# Patient Record
(demographics unavailable — no encounter records)

---

## 2024-11-22 NOTE — DISCUSSION/SUMMARY
[FreeTextEntry1] : This is an 84 year old man with a history of obesity and hyperlipidemia who presents to the office for a cardiac evaluation.  I saw him in early June of 2022 and he was noted to be in new onset AF. He had a Holter that showed persistent rate controlled AF.  He had a nuclear stress test that was negative for ischemia.  He had an echocardiogram that showed normal LV function with mild to moderate aortic root dilation.  He had a repeat echo today, which will be followed.  His BP is controlled today.    His HR is controlled.   He is taking Eliquis, and will continue it at 5 bid.    His CHADSVASC is 2.  He is going to continue his statin drug.  We discussed the benefits of a healthy diet, regular exercise and physical activity, and weight loss in detail. HE does not want to get checked for sleep apnea citing cost concerns. I will see him back in 3 months.  He knows to call the office with any issues.    [EKG obtained to assist in diagnosis and management of assessed problem(s)] : EKG obtained to assist in diagnosis and management of assessed problem(s)

## 2024-11-22 NOTE — HISTORY OF PRESENT ILLNESS
[FreeTextEntry1] : This is an 84 year old man with a history of obesity and hyperlipidemia who presents to the office for a cardiac evaluation.  I saw him in early June of 2022 and he was noted to be in new onset AF. He had a Holter that showed persistent rate controlled AF.  He had a nuclear stress test that was negative for ischemia.  He had an echocardiogram that showed normal LV function with mild to moderate aortic root dilation.  There was no significant valvular heart disease.  He is not exercising and has not lost any weight.  He denies exertional dyspnea.  He has been taking his Eliquis as prescribed.  He does not have chest pains, PND, orthopnea, LE swelling, dizziness, or syncope.   He does not feel palpitations or a racing heart.  He denies CHF, DM, CVA, TIA, or any vascular disease.   His BP is well controlled.   HR is controlled.  NO symptoms with his usual degree of walking.

## 2024-11-22 NOTE — PHYSICAL EXAM

## 2025-02-05 NOTE — HISTORY OF PRESENT ILLNESS
[Post-hospitalization from ___ Hospital] : Post-hospitalization from [unfilled] Hospital [Admitted on: ___] : The patient was admitted on [unfilled] [Discharged on ___] : discharged on [unfilled] [Discharge Summary] : discharge summary [Pertinent Labs] : pertinent labs [Radiology Findings] : radiology findings [Discharge Med List] : discharge medication list [Med Reconciliation] : medication reconciliation has been completed [Patient Contacted By: ____] : and contacted by [unfilled] [FreeTextEntry2] : ZAK QUISPE is an 84-year-old M who presents today for hospital follow up from [facility]. The patient was admitted to the hospital from 01/26/25 to 01/31/2025 for Pneumonia. The patient was initially having SOB on exertion, could barely walk for two weeks. He presented to the Er with cough and GONSALES. The hospitalization was complicated by an episode of rapid atrial fibrillation. Dr. Tapia, his cardiologist saw him at the hospital. He was also seen by pulmonary medicine.The pneumonia was not seen on CXR but became apparent on CT Chest. He received Rocephin and Azithromycin while hospitalized. He was discharged on Advair 100/50 diskus 2 puffs BID and Spiriva 2 puffs qd. Patient was treated with IV cephtrixone and zithromax.  Patient was dicharged with advair 1 puff daily and sprivia 2 puffs a day. Patient is now feeling normal except for residual mucus filled cough.

## 2025-02-05 NOTE — HISTORY OF PRESENT ILLNESS
[Post-hospitalization from ___ Hospital] : Post-hospitalization from [unfilled] Hospital [Admitted on: ___] : The patient was admitted on [unfilled] [Discharged on ___] : discharged on [unfilled] [Discharge Summary] : discharge summary [Pertinent Labs] : pertinent labs [Radiology Findings] : radiology findings [Discharge Med List] : discharge medication list [Med Reconciliation] : medication reconciliation has been completed [Patient Contacted By: ____] : and contacted by [unfilled] [FreeTextEntry2] : ZAK QIUSPE is an 84-year-old M who presents today for hospital follow up from [facility]. The patient was admitted to the hospital from 01/26/25 to 01/31/2025 for Pneumonia. The patient was initially having SOB on exertion, could barely walk for two weeks. He presented to the Er with cough and GONSALES. The hospitalization was complicated by an episode of rapid atrial fibrillation. Dr. Tapia, his cardiologist saw him at the hospital. He was also seen by pulmonary medicine.The pneumonia was not seen on CXR but became apparent on CT Chest. He received Rocephin and Azithromycin while hospitalized. He was discharged on Advair 100/50 diskus 2 puffs BID and Spiriva 2 puffs qd. Patient was treated with IV cephtrixone and zithromax.  Patient was dicharged with advair 1 puff daily and sprivia 2 puffs a day. Patient is now feeling normal except for residual mucus filled cough.

## 2025-02-05 NOTE — PHYSICAL EXAM
[No Acute Distress] : no acute distress [Well Nourished] : well nourished [Well Developed] : well developed [Well-Appearing] : well-appearing [Normal Sclera/Conjunctiva] : normal sclera/conjunctiva [PERRL] : pupils equal round and reactive to light [EOMI] : extraocular movements intact [Normal Outer Ear/Nose] : the outer ears and nose were normal in appearance [Normal Oropharynx] : the oropharynx was normal [No JVD] : no jugular venous distention [No Lymphadenopathy] : no lymphadenopathy [Supple] : supple [Thyroid Normal, No Nodules] : the thyroid was normal and there were no nodules present [No Respiratory Distress] : no respiratory distress  [No Accessory Muscle Use] : no accessory muscle use [Clear to Auscultation] : lungs were clear to auscultation bilaterally [Normal Rate] : normal rate  [Regular Rhythm] : with a regular rhythm [Normal S1, S2] : normal S1 and S2 [No Murmur] : no murmur heard [No Carotid Bruits] : no carotid bruits [No Abdominal Bruit] : a ~M bruit was not heard ~T in the abdomen [No Varicosities] : no varicosities [Pedal Pulses Present] : the pedal pulses are present [No Edema] : there was no peripheral edema [No Palpable Aorta] : no palpable aorta [No Extremity Clubbing/Cyanosis] : no extremity clubbing/cyanosis [Soft] : abdomen soft [Non Tender] : non-tender [Non-distended] : non-distended [No Masses] : no abdominal mass palpated [No HSM] : no HSM [Normal Bowel Sounds] : normal bowel sounds [Normal Posterior Cervical Nodes] : no posterior cervical lymphadenopathy [Normal Anterior Cervical Nodes] : no anterior cervical lymphadenopathy [No CVA Tenderness] : no CVA  tenderness [No Spinal Tenderness] : no spinal tenderness [No Joint Swelling] : no joint swelling [Grossly Normal Strength/Tone] : grossly normal strength/tone [No Rash] : no rash [Coordination Grossly Intact] : coordination grossly intact [No Focal Deficits] : no focal deficits [Normal Gait] : normal gait [Deep Tendon Reflexes (DTR)] : deep tendon reflexes were 2+ and symmetric [Normal Affect] : the affect was normal [Normal Insight/Judgement] : insight and judgment were intact [Normal Voice/Communication] : normal voice/communication [Normal TMs] : both tympanic membranes were normal [Normal Nasal Mucosa] : the nasal mucosa was normal [Normal Supraclavicular Nodes] : no supraclavicular lymphadenopathy [Kyphosis] : no kyphosis [Scoliosis] : no scoliosis [Acne] : no acne [Speech Grossly Normal] : speech grossly normal [Memory Grossly Normal] : memory grossly normal [Alert and Oriented x3] : oriented to person, place, and time [Normal Mood] : the mood was normal [de-identified] : upper airway rhonchi, mucus on cough [de-identified] : irregularly, irregular rhythm [de-identified] : obese

## 2025-02-05 NOTE — PHYSICAL EXAM
[No Acute Distress] : no acute distress [Well Nourished] : well nourished [Well Developed] : well developed [Well-Appearing] : well-appearing [Normal Sclera/Conjunctiva] : normal sclera/conjunctiva [PERRL] : pupils equal round and reactive to light [EOMI] : extraocular movements intact [Normal Outer Ear/Nose] : the outer ears and nose were normal in appearance [Normal Oropharynx] : the oropharynx was normal [No JVD] : no jugular venous distention [No Lymphadenopathy] : no lymphadenopathy [Supple] : supple [Thyroid Normal, No Nodules] : the thyroid was normal and there were no nodules present [No Respiratory Distress] : no respiratory distress  [No Accessory Muscle Use] : no accessory muscle use [Clear to Auscultation] : lungs were clear to auscultation bilaterally [Normal Rate] : normal rate  [Regular Rhythm] : with a regular rhythm [Normal S1, S2] : normal S1 and S2 [No Murmur] : no murmur heard [No Carotid Bruits] : no carotid bruits [No Abdominal Bruit] : a ~M bruit was not heard ~T in the abdomen [No Varicosities] : no varicosities [Pedal Pulses Present] : the pedal pulses are present [No Edema] : there was no peripheral edema [No Palpable Aorta] : no palpable aorta [No Extremity Clubbing/Cyanosis] : no extremity clubbing/cyanosis [Soft] : abdomen soft [Non Tender] : non-tender [Non-distended] : non-distended [No Masses] : no abdominal mass palpated [No HSM] : no HSM [Normal Bowel Sounds] : normal bowel sounds [Normal Posterior Cervical Nodes] : no posterior cervical lymphadenopathy [Normal Anterior Cervical Nodes] : no anterior cervical lymphadenopathy [No CVA Tenderness] : no CVA  tenderness [No Spinal Tenderness] : no spinal tenderness [No Joint Swelling] : no joint swelling [Grossly Normal Strength/Tone] : grossly normal strength/tone [No Rash] : no rash [Coordination Grossly Intact] : coordination grossly intact [No Focal Deficits] : no focal deficits [Normal Gait] : normal gait [Deep Tendon Reflexes (DTR)] : deep tendon reflexes were 2+ and symmetric [Normal Affect] : the affect was normal [Normal Insight/Judgement] : insight and judgment were intact [Normal Voice/Communication] : normal voice/communication [Normal TMs] : both tympanic membranes were normal [Normal Nasal Mucosa] : the nasal mucosa was normal [Normal Supraclavicular Nodes] : no supraclavicular lymphadenopathy [Kyphosis] : no kyphosis [Scoliosis] : no scoliosis [Acne] : no acne [Speech Grossly Normal] : speech grossly normal [Memory Grossly Normal] : memory grossly normal [Alert and Oriented x3] : oriented to person, place, and time [Normal Mood] : the mood was normal [de-identified] : upper airway rhonchi, mucus on cough [de-identified] : irregularly, irregular rhythm [de-identified] : obese

## 2025-02-05 NOTE — END OF VISIT
[FreeTextEntry3] : I, Mila Chaves, acted solely as scribe for Dr. Arian Mcallister DO on this date 02/05/2025.  All medical record entries made by the Scribe were at my, Dr. Arian Mcallister DO direction and personally dictated by me on 02/05/2025, I have reviewed the chart and agree that the record accurately reflects my personal performance of the history, physical exam, assessment and plan. I have also personally directed, reviewed and agreed with the chart.   [Time Spent: ___ minutes] : I have spent [unfilled] minutes of time on the encounter which excludes teaching and separately reported services.

## 2025-02-05 NOTE — PLAN
[FreeTextEntry1] : Pulmonary resolving right sided PNA -Rx given for CT scan of the chest- recommended to go at the beginning of March 2025-assess for resolution of infiltrates, not seen on CXR -start Mucinex p.o for expectorating upper airway mucous -continue with advair and sprivia  - follow up with Dr. Tapia, cardiologist -follow up with  pulmonologist-to get PFTs at pulmonary appointment -follow up in 6 weeks. FBW

## 2025-02-05 NOTE — ASSESSMENT
[FreeTextEntry1] : ZAK QUISPE is an 84-year-old M with a past medical history as above who presents for post hospital discharge for Pneumonia complicated by rapid atrial fibrillation.

## 2025-02-28 NOTE — HISTORY OF PRESENT ILLNESS
[FreeTextEntry1] : This is an 84 year old man with a history of obesity and hyperlipidemia who presents to the office for a cardiac evaluation.  I saw him in early June of 2022 and he was noted to be in new onset AF. He had a Holter that showed persistent rate controlled AF.  He had a nuclear stress test that was negative for ischemia.  He had an echocardiogram that showed normal LV function with mild to moderate aortic root dilation.  There was no significant valvular heart disease.  He was admitted to  with PNA in January of 2025.  He had rapid AF that was ultimately controlled as his PNA was treated. He was given IV abx.  He has now completed his abx therapy.  He is not exercising and has not lost any weight.  He denies exertional dyspnea.  He has been taking his Eliquis as prescribed.  He does not have chest pains, PND, orthopnea, LE swelling, dizziness, or syncope.   He does not feel palpitations or a racing heart.  He denies CHF, DM, CVA, TIA, or any vascular disease.   His BP is well controlled.   HR is controlled.  NO symptoms with his usual degree of walking.

## 2025-02-28 NOTE — DISCUSSION/SUMMARY
[FreeTextEntry1] : This is an 84 year old man with a history of obesity and hyperlipidemia who presents to the office for a cardiac evaluation.  I saw him in early June of 2022 and he was noted to be in new onset AF. He had a Holter that showed persistent rate controlled AF.  He had a nuclear stress test that was negative for ischemia.  He had an echocardiogram that showed normal LV function with mild to moderate aortic root dilation.  There was no significant valvular heart disease.  His BP is controlled today.    His HR is controlled.   He is taking Eliquis, and will continue it at 5 bid.    His CHADSVASC is 2.  He is going to continue his statin drug.  He is recovered from his admission for PNA in January without issues.  We discussed the benefits of a healthy diet, regular exercise and physical activity, and weight loss in detail. HE does not want to get checked for sleep apnea citing cost concerns. I will see him back in 3 months.  He knows to call the office with any issues. [EKG obtained to assist in diagnosis and management of assessed problem(s)] : EKG obtained to assist in diagnosis and management of assessed problem(s)

## 2025-02-28 NOTE — PHYSICAL EXAM

## 2025-03-12 NOTE — PHYSICAL EXAM
[No Acute Distress] : no acute distress [Well Nourished] : well nourished [Well Developed] : well developed [Well-Appearing] : well-appearing [Normal Voice/Communication] : normal voice/communication [Normal Sclera/Conjunctiva] : normal sclera/conjunctiva [PERRL] : pupils equal round and reactive to light [EOMI] : extraocular movements intact [Normal Outer Ear/Nose] : the outer ears and nose were normal in appearance [Normal Oropharynx] : the oropharynx was normal [Normal TMs] : both tympanic membranes were normal [Normal Nasal Mucosa] : the nasal mucosa was normal [No JVD] : no jugular venous distention [No Lymphadenopathy] : no lymphadenopathy [Supple] : supple [Thyroid Normal, No Nodules] : the thyroid was normal and there were no nodules present [No Respiratory Distress] : no respiratory distress  [No Accessory Muscle Use] : no accessory muscle use [Clear to Auscultation] : lungs were clear to auscultation bilaterally [Normal Rate] : normal rate  [Normal S1, S2] : normal S1 and S2 [No Carotid Bruits] : no carotid bruits [No Abdominal Bruit] : a ~M bruit was not heard ~T in the abdomen [Pedal Pulses Present] : the pedal pulses are present [No Edema] : there was no peripheral edema [No Palpable Aorta] : no palpable aorta [Soft] : abdomen soft [Non Tender] : non-tender [Non-distended] : non-distended [No Masses] : no abdominal mass palpated [No HSM] : no HSM [Normal Bowel Sounds] : normal bowel sounds [Normal Supraclavicular Nodes] : no supraclavicular lymphadenopathy [Normal Posterior Cervical Nodes] : no posterior cervical lymphadenopathy [Normal Anterior Cervical Nodes] : no anterior cervical lymphadenopathy [No CVA Tenderness] : no CVA  tenderness [No Spinal Tenderness] : no spinal tenderness [No Joint Swelling] : no joint swelling [Grossly Normal Strength/Tone] : grossly normal strength/tone [No Rash] : no rash [Coordination Grossly Intact] : coordination grossly intact [No Focal Deficits] : no focal deficits [Normal Gait] : normal gait [Deep Tendon Reflexes (DTR)] : deep tendon reflexes were 2+ and symmetric [Speech Grossly Normal] : speech grossly normal [Memory Grossly Normal] : memory grossly normal [Normal Affect] : the affect was normal [Alert and Oriented x3] : oriented to person, place, and time [Normal Mood] : the mood was normal [Normal Insight/Judgement] : insight and judgment were intact [Normal] : affect was normal and insight and judgment were intact [Kyphosis] : no kyphosis [Scoliosis] : no scoliosis [Acne] : no acne [de-identified] : w/ murmur and irregular rate  [de-identified] : obese

## 2025-03-12 NOTE — COUNSELING
[Yes] : Risk of tobacco use and health benefits of smoking cessation discussed: Yes [Cessation strategies including cessation program discussed] : Cessation strategies including cessation program discussed [Encouraged to pick a quit date and identify support needed to quit] : Encouraged to pick a quit date and identify support needed to quit [No] : Not willing to quit smoking [Benefits of weight loss discussed] : Benefits of weight loss discussed [Encouraged to increase physical activity] : Encouraged to increase physical activity [Good understanding] : Patient has a good understanding of disease, goals and obesity follow-up plan [FreeTextEntry1] : 3 [FreeTextEntry2] : continue with walking  [FreeTextEntry4] : 15

## 2025-03-12 NOTE — HEALTH RISK ASSESSMENT
[Never (0 pts)] : Never (0 points) [No] : In the past 12 months have you used drugs other than those required for medical reasons? No [No falls in past year] : Patient reported no falls in the past year [Little interest or pleasure doing things] : 1) Little interest or pleasure doing things [0] : 1) Little interest or pleasure doing things: Not at all (0) [Feeling down, depressed, or hopeless] : 2) Feeling down, depressed, or hopeless [1] : 2) Feeling down, depressed, or hopeless for several days (1) [PHQ-2 Negative - No further assessment needed] : PHQ-2 Negative - No further assessment needed [I have developed a follow-up plan documented below in the note.] : I have developed a follow-up plan documented below in the note. [Time Spent: ___ Minutes] : I spent [unfilled] minutes performing a depression screening for this patient. [Current] : Current [de-identified] : Cardio [Audit-CScore] : 0 [[id number]] : 1 [de-identified] : kyra

## 2025-03-12 NOTE — ASSESSMENT
[FreeTextEntry1] : Mr. QUISPE is a 84 year-old male, with a past medical history as noted above, who present to the office today for medication renewal and fasting abs

## 2025-03-12 NOTE — HISTORY OF PRESENT ILLNESS
[FreeTextEntry1] : fasting blood work and general follow-up.  [de-identified] : Mr. QUISPE is a 84-year-old-male, with a past medical history as noted below, who present to the office today for  fasting labs and follow up.  Brought list from [facility] about his recent vax hx.   states he feels much better - denies SOB, Fever or cough States he ran out of Metoprolol and diltiazem - been off the medication for a week or so  Requesting a renewal of trazadone

## 2025-03-12 NOTE — PHYSICAL EXAM
[No Acute Distress] : no acute distress [Well Nourished] : well nourished [Well Developed] : well developed [Well-Appearing] : well-appearing [Normal Voice/Communication] : normal voice/communication [Normal Sclera/Conjunctiva] : normal sclera/conjunctiva [PERRL] : pupils equal round and reactive to light [EOMI] : extraocular movements intact [Normal Outer Ear/Nose] : the outer ears and nose were normal in appearance [Normal Oropharynx] : the oropharynx was normal [Normal TMs] : both tympanic membranes were normal [Normal Nasal Mucosa] : the nasal mucosa was normal [No JVD] : no jugular venous distention [No Lymphadenopathy] : no lymphadenopathy [Supple] : supple [Thyroid Normal, No Nodules] : the thyroid was normal and there were no nodules present [No Respiratory Distress] : no respiratory distress  [No Accessory Muscle Use] : no accessory muscle use [Clear to Auscultation] : lungs were clear to auscultation bilaterally [Normal Rate] : normal rate  [Normal S1, S2] : normal S1 and S2 [No Carotid Bruits] : no carotid bruits [No Abdominal Bruit] : a ~M bruit was not heard ~T in the abdomen [Pedal Pulses Present] : the pedal pulses are present [No Edema] : there was no peripheral edema [No Palpable Aorta] : no palpable aorta [Soft] : abdomen soft [Non Tender] : non-tender [Non-distended] : non-distended [No Masses] : no abdominal mass palpated [No HSM] : no HSM [Normal Bowel Sounds] : normal bowel sounds [Normal Supraclavicular Nodes] : no supraclavicular lymphadenopathy [Normal Posterior Cervical Nodes] : no posterior cervical lymphadenopathy [Normal Anterior Cervical Nodes] : no anterior cervical lymphadenopathy [No CVA Tenderness] : no CVA  tenderness [No Spinal Tenderness] : no spinal tenderness [No Joint Swelling] : no joint swelling [Grossly Normal Strength/Tone] : grossly normal strength/tone [No Rash] : no rash [Coordination Grossly Intact] : coordination grossly intact [No Focal Deficits] : no focal deficits [Normal Gait] : normal gait [Deep Tendon Reflexes (DTR)] : deep tendon reflexes were 2+ and symmetric [Speech Grossly Normal] : speech grossly normal [Memory Grossly Normal] : memory grossly normal [Normal Affect] : the affect was normal [Alert and Oriented x3] : oriented to person, place, and time [Normal Mood] : the mood was normal [Normal Insight/Judgement] : insight and judgment were intact [Normal] : affect was normal and insight and judgment were intact [Kyphosis] : no kyphosis [Scoliosis] : no scoliosis [Acne] : no acne [de-identified] : w/ murmur and irregular rate  [de-identified] : obese

## 2025-03-12 NOTE — HEALTH RISK ASSESSMENT
[Never (0 pts)] : Never (0 points) [No] : In the past 12 months have you used drugs other than those required for medical reasons? No [No falls in past year] : Patient reported no falls in the past year [Little interest or pleasure doing things] : 1) Little interest or pleasure doing things [0] : 1) Little interest or pleasure doing things: Not at all (0) [Feeling down, depressed, or hopeless] : 2) Feeling down, depressed, or hopeless [1] : 2) Feeling down, depressed, or hopeless for several days (1) [PHQ-2 Negative - No further assessment needed] : PHQ-2 Negative - No further assessment needed [I have developed a follow-up plan documented below in the note.] : I have developed a follow-up plan documented below in the note. [Time Spent: ___ Minutes] : I spent [unfilled] minutes performing a depression screening for this patient. [Current] : Current [de-identified] : Cardio [Audit-CScore] : 0 [[id number]] : 1 [de-identified] : kyra

## 2025-03-12 NOTE — HISTORY OF PRESENT ILLNESS
[FreeTextEntry1] : fasting blood work and general follow-up.  [de-identified] : Mr. QUISPE is a 84-year-old-male, with a past medical history as noted below, who present to the office today for  fasting labs and follow up.  Brought list from [facility] about his recent vax hx.   states he feels much better - denies SOB, Fever or cough States he ran out of Metoprolol and diltiazem - been off the medication for a week or so  Requesting a renewal of trazadone

## 2025-03-12 NOTE — PLAN
[FreeTextEntry1] : Pulmonary s/p pneumonia- Feeling better -  Continue with inhalers - Advised to d/c tobacco  Pneumovax completed   Cardiovascular hyperlipidemia - continue Simvastatin 20mg p.o.q.d. as directed - advised low cholesterol/low fat diet - check FLP and LFTs. Advised diet  if LDL not less than 70 consider changing to atorvastatin  Follow up w/ cardiology  Atrial fibrillation - continue Eliquis 5mg BID p.o.q.d. as directed Continue to follow up with cardiologist, Dr. Ocampo. Advised and educated pt for 15 min about compliance w/ meds Advised to restart Diltiazem and metoprolol as prescribe at the hospital. Monitor BP  Cardiac counseling was provided to ZAK QUISPE. We discussed the importance of diet and exercise.  We discussed low na, low carbs, and  low fat diets.   ZAK was counseled about the importance of medication compliance. Risk and benefits was discussed to the patient about their diagnosis and treatment   Endocrinology Adult BMI screening and followup:  face to face education and discussion lasting 15 minutes. The patient's BMI is about 34. Counseled patient regarding BMI, healthy eating, portion control and exercise importance of diet to maintain a healthy weight. Counseled patient on importance of exercise to maintain a healthy weight  history of Vitamin D deficiency - continue Vitamin D-3 1000 IU p.o.q.d. with a meal as directed  Psychiatry insomnia - continue Trazodone 50mg p.o.q.h.s. as directed. Refilled today   Urology prostate cancer - s/p radiation therapy - continue to follow up with urologist, Dr. Iraheta and radiation oncologist, Dr. Rodríguez. Check PSA levels - will foward results to Dr. Iraheta office.   Reviewed immunization given by [facility]  - see scan   I spent 40 Minutes with the patient, half of which we discussed finding on physical exam and coordinated care.  As well as reviewed my plans and follow ups. Dragon speech recognition software was used to create portions of this document.  An attempt at proofreading has been made to minimize errors please call if any questions arise.

## 2025-03-12 NOTE — REVIEW OF SYSTEMS
[Negative] : Heme/Lymph [Chills] : no chills [Fatigue] : no fatigue [Discharge] : no discharge [Chest Pain] : no chest pain [Palpitations] : no palpitations [Claudication] : no  leg claudication [Lower Ext Edema] : no lower extremity edema [Wheezing] : no wheezing [Cough] : no cough [Abdominal Pain] : no abdominal pain [Nausea] : no nausea [Heartburn] : no heartburn [Dysuria] : no dysuria [Frequency] : no frequency [Joint Pain] : no joint pain [Skin Rash] : no skin rash [Dizziness] : no dizziness [Memory Loss] : no memory loss [Easy Bruising] : no easy bruising [Swollen Glands] : no swollen glands

## 2025-04-29 NOTE — ASSESSMENT
[FreeTextEntry1] : Mr. QUISPE is a 84 year-old male, with a past medical history as noted above, who present to the office today for medication renewal and b/l hand numbness

## 2025-04-29 NOTE — HEALTH RISK ASSESSMENT
[Never (0 pts)] : Never (0 points) [No] : In the past 12 months have you used drugs other than those required for medical reasons? No [No falls in past year] : Patient reported no falls in the past year [Little interest or pleasure doing things] : 1) Little interest or pleasure doing things [0] : 1) Little interest or pleasure doing things: Not at all (0) [Feeling down, depressed, or hopeless] : 2) Feeling down, depressed, or hopeless [1] : 2) Feeling down, depressed, or hopeless for several days (1) [PHQ-2 Negative - No further assessment needed] : PHQ-2 Negative - No further assessment needed [I have developed a follow-up plan documented below in the note.] : I have developed a follow-up plan documented below in the note. [Time Spent: ___ Minutes] : I spent [unfilled] minutes performing a depression screening for this patient. [Current] : Current [de-identified] : Cardio [Audit-CScore] : 0 [[id number]] : 1 [de-identified] : kyra

## 2025-04-29 NOTE — PHYSICAL EXAM
[No Acute Distress] : no acute distress [Well Nourished] : well nourished [Well Developed] : well developed [Well-Appearing] : well-appearing [Normal Voice/Communication] : normal voice/communication [Normal Sclera/Conjunctiva] : normal sclera/conjunctiva [PERRL] : pupils equal round and reactive to light [EOMI] : extraocular movements intact [Normal Outer Ear/Nose] : the outer ears and nose were normal in appearance [Normal Oropharynx] : the oropharynx was normal [Normal TMs] : both tympanic membranes were normal [Normal Nasal Mucosa] : the nasal mucosa was normal [No JVD] : no jugular venous distention [No Lymphadenopathy] : no lymphadenopathy [Supple] : supple [Thyroid Normal, No Nodules] : the thyroid was normal and there were no nodules present [No Respiratory Distress] : no respiratory distress  [No Accessory Muscle Use] : no accessory muscle use [Clear to Auscultation] : lungs were clear to auscultation bilaterally [Normal Rate] : normal rate  [Normal S1, S2] : normal S1 and S2 [No Carotid Bruits] : no carotid bruits [No Abdominal Bruit] : a ~M bruit was not heard ~T in the abdomen [Pedal Pulses Present] : the pedal pulses are present [No Edema] : there was no peripheral edema [No Palpable Aorta] : no palpable aorta [Soft] : abdomen soft [Non Tender] : non-tender [Non-distended] : non-distended [No Masses] : no abdominal mass palpated [No HSM] : no HSM [Normal Bowel Sounds] : normal bowel sounds [Normal Supraclavicular Nodes] : no supraclavicular lymphadenopathy [Normal Posterior Cervical Nodes] : no posterior cervical lymphadenopathy [Normal Anterior Cervical Nodes] : no anterior cervical lymphadenopathy [No CVA Tenderness] : no CVA  tenderness [No Spinal Tenderness] : no spinal tenderness [No Joint Swelling] : no joint swelling [Grossly Normal Strength/Tone] : grossly normal strength/tone [No Rash] : no rash [Coordination Grossly Intact] : coordination grossly intact [No Focal Deficits] : no focal deficits [Normal Gait] : normal gait [Deep Tendon Reflexes (DTR)] : deep tendon reflexes were 2+ and symmetric [Speech Grossly Normal] : speech grossly normal [Memory Grossly Normal] : memory grossly normal [Normal Affect] : the affect was normal [Alert and Oriented x3] : oriented to person, place, and time [Normal Mood] : the mood was normal [Normal Insight/Judgement] : insight and judgment were intact [Normal] : affect was normal and insight and judgment were intact [Kyphosis] : no kyphosis [Scoliosis] : no scoliosis [Acne] : no acne [de-identified] : w/ murmur and irregular rate  [de-identified] : obese

## 2025-04-29 NOTE — PLAN
[FreeTextEntry1] : Pulmonary s/p pneumonia- Feeling better -  Continue with inhalers - Advised to d/c tobacco   Cardiovascular hyperlipidemia - Advised patient to continue with atorvastatin 20mg p.o.q.d. as directed - advised low cholesterol/low fat diet - check FLP and LFTs. 06/25 Follow up w/ cardiology 05/25   Atrial fibrillation - continue Eliquis 5mg BID p.o.q.d. as directed Continue to follow up with cardiologist, Dr. Ocampo. Continue with Diltiazem and metoprolol as prescribe at the hospital. Monitor BP  Cardiac counseling was provided to ZAK QUISPE. We discussed the importance of diet and exercise.  We discussed low na, low carbs, and  low fat diets.   ZAK was counseled about the importance of medication compliance. Risk and benefits was discussed to the patient about their diagnosis and treatment    Orthopedic-bilateral carpal tunnel syndrome-follow-up with orthopedic to consider treatment options including surgical  Endocrinology Adult BMI screening and followup:  face to face education and discussion lasting 5 minutes. The patient's BMI is about 34. Counseled patient regarding BMI, healthy eating, portion control and exercise importance of diet to maintain a healthy weight. Counseled patient on importance of exercise to maintain a healthy weight  history of Vitamin D deficiency - continue Vitamin D-3 1000 IU p.o.q.d. with a meal as directed  Psychiatry insomnia - continue Trazodone 50mg p.o.q.h.s. as directed. Refilled today  Sleep hygiene advised.  Urology prostate cancer - s/p radiation therapy - continue to follow up with urologist, Dr. Iraheta and radiation oncologist, Dr. Rodríguez. Check PSA levels - will forward results to Dr. Iraheta office.   I spent 30 Minutes with the patient, half of which we discussed finding on physical exam and coordinated care.  As well as reviewed my plans and follow ups. Dragon speech recognition software was used to create portions of this document.  An attempt at proofreading has been made to minimize errors please call if any questions arise.

## 2025-04-29 NOTE — REVIEW OF SYSTEMS
[Negative] : Heme/Lymph [Fever] : no fever [Chills] : no chills [Fatigue] : no fatigue [Discharge] : no discharge [Pain] : no pain [Earache] : no earache [Sore Throat] : no sore throat [Chest Pain] : no chest pain [Palpitations] : no palpitations [Claudication] : no  leg claudication [Lower Ext Edema] : no lower extremity edema [Wheezing] : no wheezing [Cough] : no cough [Abdominal Pain] : no abdominal pain [Nausea] : no nausea [Heartburn] : no heartburn [Dysuria] : no dysuria [Frequency] : no frequency [Joint Pain] : no joint pain [Skin Rash] : no skin rash [Dizziness] : no dizziness [Memory Loss] : no memory loss [Anxiety] : no anxiety [Easy Bruising] : no easy bruising [Swollen Glands] : no swollen glands

## 2025-04-29 NOTE — HISTORY OF PRESENT ILLNESS
[FreeTextEntry1] : Follow up from pneumonia and medication renewal   [de-identified] : Mr. QUISPE is a 84-year-old-male, with a past medical history as noted below, who present to the office today for  follow up from pneumonia -patient states currently he is taking all his medication as prescribed.  Restarted diltiazem and metoprolol which she had previously stopped.  Denies having any adverse side effects to current medication regimen.  Also requesting renewal on trazodone which she takes for sleep/insomnia with moderate states it only gives him about 4 hours of sleep.  Has a follow-up appointment cardiology and may end a physical in early June patient denies having any fever, chills, night sweats, chest pain or shortness of breath. Patient states since the weather has been nice and he started walking denies feeling shortness of breath or dyspnea on exertion with his walks.  He is compliant with using his inhalers.  Patient states he has been having bilateral hand numbness in the first 3 digits.  In the past did see orthopedic was diagnosed with carpal tunnel syndrome..  States he had an injection which seemed to help.  Would like to see another orthopedic to get more injections

## 2025-04-29 NOTE — COUNSELING
[Yes] : Risk of tobacco use and health benefits of smoking cessation discussed: Yes [Cessation strategies including cessation program discussed] : Cessation strategies including cessation program discussed [Encouraged to pick a quit date and identify support needed to quit] : Encouraged to pick a quit date and identify support needed to quit [No] : Not willing to quit smoking [Benefits of weight loss discussed] : Benefits of weight loss discussed [Encouraged to increase physical activity] : Encouraged to increase physical activity [FreeTextEntry1] : 3 [FreeTextEntry2] : continue with walking  Advised caloric restriction with 1800 zulema a day

## 2025-04-30 NOTE — CONSULT LETTER
[Dear  ___] : Dear  [unfilled], [Consult Letter:] : I had the pleasure of evaluating your patient, [unfilled]. [Please see my note below.] : Please see my note below. [Consult Closing:] : Thank you very much for allowing me to participate in the care of this patient.  If you have any questions, please do not hesitate to contact me. [FreeTextEntry3] : Sincerely,  Hillary Hewitt MD [facility] tel: [phone] fax: [phone]

## 2025-04-30 NOTE — HISTORY OF PRESENT ILLNESS
[Current] : current [TextBox_4] : Mr. ZAK QUISPE is a 84 year old man pipe smoker  with  hyperlipidemia, A-fib on Eliquis, prostate cancer (s/p RT x 9 weeks) is here for evaluation.    Was hospitalized Jan 26-31 at [place] with SOB. Admitted for PNA, started on IV abx Rocephin and azithro. Patient was placed on 5 day course of steroids as well. No prior respiratory related hospitalizations.   Currently on Advair and spiriva.  Denies respiratory sx at this time. Cooking, cleaning, walking 3-4 miles/day. No cough, no wheezing, no chest tighntess.  Still smoking his pipe.     ROS: denies fevers, chills, night sweats, unintentional weight loss    PMH: hyperlipidemia, A-fib on Eliquis, prostate cancer (s/p RT x 9 weeks) Meds: per chart All: NKDA SH: pipe smoker for 30 years FH: Denies family hx of pulmonary disease PMD: MONICA JAVED

## 2025-05-12 NOTE — END OF VISIT
[FreeTextEntry3] : This note was written by Chandrakant Tran on 05/12/2025 acting solely as a scribe for Dr. Chapincito Gan.   All medical record entries made by the Scribe were at my, Dr. Chapincito Gan, direction and personally dictated by me on 05/12/2025. I have personally reviewed the chart and agree that the record accurately reflects my personal performance of the history, physical exam, assessment and plan.

## 2025-05-12 NOTE — REVIEW OF SYSTEMS
[Right] : right [Negative] : Allergic/Immunologic [FreeTextEntry5] : see HPI [de-identified] : see HPI

## 2025-05-12 NOTE — PROCEDURE
[FreeTextEntry1] :  - After a discussion of risks and benefits, the patient agreed to proceed with a cortisone injection. - Side injected: bilateral carpal tunnels. - Medications injected: 1cc of 1% Lidocaine and 1cc of Celestone Soluspan, 6mg/cc, using sterile technique. - Ultrasound guidance: Ultrasound confirmed proper needle localization within the carpal tunnel, thereby protecting the median nerve, prior to the injection. - Patient tolerated the procedure well, without complications. - Instructions: The patient was instructed on the use of a carpal tunnel splint, especially at night. - Follow-up: Within 4 weeks to assess response to the injection.

## 2025-05-12 NOTE — ADDENDUM
[FreeTextEntry1] :  I, Chandrakant Tran, acted solely as a scribe for Dr. Gan on this date on 05/12/2025.

## 2025-05-12 NOTE — CONSULT LETTER
[Dear  ___] : Dear  [unfilled], [Consult Letter:] : I had the pleasure of evaluating your patient, [unfilled]. [Please see my note below.] : Please see my note below. [Consult Closing:] : Thank you very much for allowing me to participate in the care of this patient.  If you have any questions, please do not hesitate to contact me. [Sincerely,] : Sincerely, [FreeTextEntry3] : Chapincito Gan M.D. Surgery of the Hand & Upper Extremity Orthopaedic Surgery Chief, Hand Service, [facility]  [facility], [employer] [facility] [employer] at [facility]Email: [email or web address] Phone: [phone]

## 2025-05-12 NOTE — HISTORY OF PRESENT ILLNESS
[Right] : right hand dominant [FreeTextEntry1] : He comes in today for evaluation of right greater than left bilateral hand numbness and tingling that has been ongoing for years with no known injury. He has been treated for the issue in the past by Dr. Winston with a cortisone injection and gel injection at the right wrist about seven years ago. He rates his pain as a 10/10. He denies stiffness or swelling.  He has Afib and takes Eliquis.  He was referred by Dr. Mcallister.  EMGs dated 11/12/2019 demonstrate: 1. 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 This is consistent with the clinical diagnosis of Carpal Tunnel syndrome.  2. Mild right>left sensory ulnar nerve neuropathy at the wrist.

## 2025-05-12 NOTE — DISCUSSION/SUMMARY
[FreeTextEntry1] : He has findings consistent with chronic and advanced bilateral carpal tunnel syndrome.  He also has bilateral ulnar neuropathies based upon EMGs with first dorsal interosseous wasting, but denies numbness or tingling along the ulnar nerve distributions.  I had a discussion with the patient regarding today's visit, the prognosis of this diagnosis, and treatment recommendations and options. At this time, we discussed treatment options including a cortisone injection or endoscopic carpal tunnel release. He opted to proceed with cortisone injections at the bilateral carpal tunnels.  We did briefly discuss surgery and he states that if this does not provide him with long-term relief, then he will consider surgery in the future.   The patient has agreed to the above plan of management and has expressed full understanding.  All questions were fully answered to the patient's satisfaction.  My cumulative time spent on this visit included: Preparation for the visit, review of the medical records, review of pertinent diagnostic studies, examination and counseling of the patient on the above diagnosis, treatment plan and prognosis, orders of diagnostic tests, medication and/or appropriate procedures and documentation in the medical records of today's visit.

## 2025-05-12 NOTE — PHYSICAL EXAM
[de-identified] : - Constitutional: This is a male in no obvious distress.   - Psych: Patient is alert and oriented to person, place and time.  Patient has a normal mood and affect.  - Cardiovascular: Normal pulses throughout the upper extremities.  No significant varicosities are noted in the upper extremities.   ---   Examination of both hands demonstrates bilateral thenar atrophy.  He also has bilateral first dorsal interosseous wasting.  He has complaints of numbness and tingling along the median nerve distribution bilaterally with normal sensation along the radial and ulnar nerve distributions bilaterally.  Provocative signs for carpal tunnel syndrome are positive.  Provocative signs for cubital tunnel syndrome are negative.

## 2025-05-28 NOTE — DISCUSSION/SUMMARY
[FreeTextEntry1] : This is an 84 year old man with a history of obesity and hyperlipidemia who presents to the office for a cardiac evaluation.  I saw him in early June of 2022 and he was noted to be in new onset AF. He had a Holter that showed persistent rate controlled AF.  He had a nuclear stress test that was negative for ischemia.  He had an echocardiogram that showed normal LV function with mild to moderate aortic root dilation.  There was no significant valvular heart disease.  His BP is controlled today.    His HR is controlled.   He is taking Eliquis, and will continue it at 5 bid.    His CHADSVASC is 2.  He is going to continue his statin drug.   We discussed the benefits of a healthy diet, regular exercise and physical activity, and weight loss in detail. HE does not want to get checked for sleep apnea citing cost concerns. I will see him back in 3 months.  He knows to call the office with any issues. [EKG obtained to assist in diagnosis and management of assessed problem(s)] : EKG obtained to assist in diagnosis and management of assessed problem(s)

## 2025-05-28 NOTE — REASON FOR VISIT
Informed pt of below.  Made fup with dr del rio 5-19-21.  Pt verbalized understanding    [Symptom and Test Evaluation] : symptom and test evaluation [Arrhythmia/ECG Abnorrmalities] : arrhythmia/ECG abnormalities

## 2025-05-28 NOTE — PHYSICAL EXAM

## 2025-06-03 NOTE — ASSESSMENT
[Vaccines Reviewed] : Immunizations reviewed today. Please see immunization details in the vaccine log within the immunization flowsheet.  [FreeTextEntry1] : Patient is an 84-year-old male with a past medical history as above who presents for an annual wellness visit.

## 2025-06-03 NOTE — PHYSICAL EXAM
[No Acute Distress] : no acute distress [Well Nourished] : well nourished [Well Developed] : well developed [Well-Appearing] : well-appearing [Normal Voice/Communication] : normal voice/communication [Normal Sclera/Conjunctiva] : normal sclera/conjunctiva [PERRL] : pupils equal round and reactive to light [EOMI] : extraocular movements intact [Normal Outer Ear/Nose] : the outer ears and nose were normal in appearance [Normal Oropharynx] : the oropharynx was normal [Normal TMs] : both tympanic membranes were normal [Normal Nasal Mucosa] : the nasal mucosa was normal [No JVD] : no jugular venous distention [No Lymphadenopathy] : no lymphadenopathy [Supple] : supple [Thyroid Normal, No Nodules] : the thyroid was normal and there were no nodules present [No Respiratory Distress] : no respiratory distress  [No Accessory Muscle Use] : no accessory muscle use [Clear to Auscultation] : lungs were clear to auscultation bilaterally [Normal Rate] : normal rate  [Normal S1, S2] : normal S1 and S2 [No Murmur] : no murmur heard [No Carotid Bruits] : no carotid bruits [No Abdominal Bruit] : a ~M bruit was not heard ~T in the abdomen [Pedal Pulses Present] : the pedal pulses are present [No Edema] : there was no peripheral edema [No Palpable Aorta] : no palpable aorta [Soft] : abdomen soft [Non Tender] : non-tender [Non-distended] : non-distended [No Masses] : no abdominal mass palpated [No HSM] : no HSM [Normal Bowel Sounds] : normal bowel sounds [Normal Supraclavicular Nodes] : no supraclavicular lymphadenopathy [Normal Posterior Cervical Nodes] : no posterior cervical lymphadenopathy [Normal Anterior Cervical Nodes] : no anterior cervical lymphadenopathy [No CVA Tenderness] : no CVA  tenderness [No Spinal Tenderness] : no spinal tenderness [No Joint Swelling] : no joint swelling [Grossly Normal Strength/Tone] : grossly normal strength/tone [No Rash] : no rash [Coordination Grossly Intact] : coordination grossly intact [No Focal Deficits] : no focal deficits [Normal Gait] : normal gait [Deep Tendon Reflexes (DTR)] : deep tendon reflexes were 2+ and symmetric [Speech Grossly Normal] : speech grossly normal [Memory Grossly Normal] : memory grossly normal [Normal Affect] : the affect was normal [Alert and Oriented x3] : oriented to person, place, and time [Normal Mood] : the mood was normal [Normal Insight/Judgement] : insight and judgment were intact [Normal] : affect was normal and insight and judgment were intact [Kyphosis] : no kyphosis [Scoliosis] : no scoliosis [Acne] : no acne [de-identified] : irregularly irregular rhythm  [de-identified] : varicose veins LE bilaterally [de-identified] : obese [FreeTextEntry1] : done by Dr. Iraheta, urologist

## 2025-06-03 NOTE — HISTORY OF PRESENT ILLNESS
[FreeTextEntry1] : annual wellness visit [de-identified] : Patient is an 84-year-old male with a past medical history as below who presents for an annual wellness visit. Patient states he is taking all medications as prescribed and denies any adverse reactions or side effects. He denies any new arthralgias or myalgias on Atorvastatin. Patient is vaccinated against COVID-19 (x7). Patient received the Pneumovax 23 vaccine on 01/06/2021. Patient received the Prevnar 13 vaccine in November of 2019. Patient received both doses of the Shingles (Shingrix) vaccine. He received the Tetanus vaccine in November of 2019. Patient has lost 4 pounds since his last office visit (has gained 15 lbs in past 12 months) which he attributes to cutting out desserts and decreasing his portion sizes. Patient states he stays physically active walking 3-4 miles a day.   Patient sees cardiologist, Dr. Ocampo, given history of HLD, dilated aortic root and atrial fibrillation (currently on Eliquis; rate controlled).  Patient saw urologist, Dr. Iraheta, on 1/25/2024.  Patient is fasting today.

## 2025-06-03 NOTE — PHYSICAL EXAM
[No Acute Distress] : no acute distress [Well Nourished] : well nourished [Well Developed] : well developed [Well-Appearing] : well-appearing [Normal Voice/Communication] : normal voice/communication [Normal Sclera/Conjunctiva] : normal sclera/conjunctiva [PERRL] : pupils equal round and reactive to light [EOMI] : extraocular movements intact [Normal Outer Ear/Nose] : the outer ears and nose were normal in appearance [Normal Oropharynx] : the oropharynx was normal [Normal TMs] : both tympanic membranes were normal [Normal Nasal Mucosa] : the nasal mucosa was normal [No JVD] : no jugular venous distention [No Lymphadenopathy] : no lymphadenopathy [Supple] : supple [Thyroid Normal, No Nodules] : the thyroid was normal and there were no nodules present [No Respiratory Distress] : no respiratory distress  [No Accessory Muscle Use] : no accessory muscle use [Clear to Auscultation] : lungs were clear to auscultation bilaterally [Normal Rate] : normal rate  [Normal S1, S2] : normal S1 and S2 [No Murmur] : no murmur heard [No Carotid Bruits] : no carotid bruits [No Abdominal Bruit] : a ~M bruit was not heard ~T in the abdomen [Pedal Pulses Present] : the pedal pulses are present [No Edema] : there was no peripheral edema [No Palpable Aorta] : no palpable aorta [Soft] : abdomen soft [Non Tender] : non-tender [Non-distended] : non-distended [No Masses] : no abdominal mass palpated [No HSM] : no HSM [Normal Bowel Sounds] : normal bowel sounds [Normal Supraclavicular Nodes] : no supraclavicular lymphadenopathy [Normal Posterior Cervical Nodes] : no posterior cervical lymphadenopathy [Normal Anterior Cervical Nodes] : no anterior cervical lymphadenopathy [No CVA Tenderness] : no CVA  tenderness [No Spinal Tenderness] : no spinal tenderness [No Joint Swelling] : no joint swelling [Grossly Normal Strength/Tone] : grossly normal strength/tone [No Rash] : no rash [Coordination Grossly Intact] : coordination grossly intact [No Focal Deficits] : no focal deficits [Normal Gait] : normal gait [Deep Tendon Reflexes (DTR)] : deep tendon reflexes were 2+ and symmetric [Speech Grossly Normal] : speech grossly normal [Memory Grossly Normal] : memory grossly normal [Normal Affect] : the affect was normal [Alert and Oriented x3] : oriented to person, place, and time [Normal Mood] : the mood was normal [Normal Insight/Judgement] : insight and judgment were intact [Normal] : affect was normal and insight and judgment were intact [Kyphosis] : no kyphosis [Scoliosis] : no scoliosis [Acne] : no acne [de-identified] : irregularly irregular rhythm  [de-identified] : varicose veins LE bilaterally [de-identified] : obese [FreeTextEntry1] : done by Dr. Iraheta, urologist

## 2025-06-03 NOTE — HISTORY OF PRESENT ILLNESS
[FreeTextEntry1] : annual wellness visit [de-identified] : Patient is an 84-year-old male with a past medical history as below who presents for an annual wellness visit. Patient states he is taking all medications as prescribed and denies any adverse reactions or side effects. He denies any new arthralgias or myalgias on Atorvastatin. Patient is vaccinated against COVID-19 (x7). Patient received the Pneumovax 23 vaccine on 01/06/2021. Patient received the Prevnar 13 vaccine in November of 2019. Patient received both doses of the Shingles (Shingrix) vaccine. He received the Tetanus vaccine in November of 2019. Patient has lost 4 pounds since his last office visit (has gained 15 lbs in past 12 months) which he attributes to cutting out desserts and decreasing his portion sizes. Patient states he stays physically active walking 3-4 miles a day.   Patient sees cardiologist, Dr. Ocampo, given history of HLD, dilated aortic root and atrial fibrillation (currently on Eliquis; rate controlled).  Patient saw urologist, Dr. Iraheta, on 1/25/2024.  Patient is fasting today.

## 2025-06-03 NOTE — PLAN
[FreeTextEntry1] : Pulmonary tobacco pipe smoker - previously discussed the importance of smoking cessation in reducing risk for CV disease/lung cancer-patient currently not ready to quit Cardiovascular lower extremity edema - secondary to venous insufficiency - previously recommended keeping legs elevated when at home; continue low sodium diet; advised continued weight loss hyperlipidemia - continue Atorvastatin 20mg p.o.q.d. as directed - advised low cholesterol/low fat diet - check FLP and LFTs atrial fibrillation - continue Eliquis 5mg BID p.o.q.d. as directed; continue Metoprolol Tartrate 25mg p.o BID as directed; Continue to follow up with cardiologist, Dr. Ocampo Endocrinology obesity - advised low carbohydrate/low sugar diet; continue CV exercise  history of Vitamin D deficiency - continue Vitamin D-3 1000 IU p.o.q.d. with a meal as directed Psychiatry insomnia - continue Trazodone 100mg p.o.q.h.s. as directed; Rx refilled Urology prostate cancer - s/p radiation therapy - continue to follow up with urologist, Dr. Iraheta and radiation oncologist, Dr. Rodríguez  Orthopedics right CTS-to obtain second opinion hand surgery  Blood work collected at the office today check EKG (as above) check pending labs- HBA1C, CBC, CMP, FLP, PSA, TSH, Urinalysis, (Vitamin B-12, D).

## 2025-06-03 NOTE — END OF VISIT
[FreeTextEntry3] : I, Mila Chaves, acted solely as scribe for Dr. Arian Mcallister DO on this date 06/03/2025.   All medical record entries made by the Scribe were at my, Dr. Arian Mcallister DO direction and personally dictated by me on 06/03/2025, I have reviewed the chart and agree that the record accurately reflects my personal performance of the history, physical exam, assessment and plan. I have also personally directed, reviewed and agreed with the chart.     [Time Spent: ___ minutes] : I have spent [unfilled] minutes of time on the encounter which excludes teaching and separately reported services.